# Patient Record
Sex: FEMALE
[De-identification: names, ages, dates, MRNs, and addresses within clinical notes are randomized per-mention and may not be internally consistent; named-entity substitution may affect disease eponyms.]

---

## 2017-08-04 ENCOUNTER — HOSPITAL ENCOUNTER (EMERGENCY)
Dept: HOSPITAL 31 - C.ER | Age: 57
LOS: 1 days | Discharge: HOME | End: 2017-08-05
Payer: MEDICAID

## 2017-08-04 VITALS — TEMPERATURE: 97.8 F

## 2017-08-04 VITALS — BODY MASS INDEX: 34 KG/M2

## 2017-08-04 DIAGNOSIS — R51: ICD-10-CM

## 2017-08-04 DIAGNOSIS — F41.9: Primary | ICD-10-CM

## 2017-08-04 DIAGNOSIS — G89.29: ICD-10-CM

## 2017-08-05 VITALS — RESPIRATION RATE: 18 BRPM | HEART RATE: 71 BPM | OXYGEN SATURATION: 98 %

## 2017-08-05 VITALS — DIASTOLIC BLOOD PRESSURE: 69 MMHG | SYSTOLIC BLOOD PRESSURE: 145 MMHG

## 2017-08-05 NOTE — C.PDOC
History Of Present Illness


54 year old female who presents to the ER with a complaint of neck pain and 

headache pain consistent with her fibromyalgia. Patient is requesting a refill 

of her valium tablets. [facility] reviewed, patient was found to have refills and 

many prescribers. 


Time Seen by Provider: 17 23:45


Chief Complaint (Nursing): Chest Pain


History Per: Patient


History/Exam Limitations: no limitations


Onset/Duration Of Symptoms: Days


Current Symptoms Are (Timing): Still Present


Associated Symptoms: denies: Nausea, Dyspnea, Diaphoresis


Modifying Factors: None


Exacerbating Factors: None


Alleviating Factors: None


Recent travel outside of the [place]: No





Past Medical History


Reviewed: Historical Data, Nursing Documentation, Vital Signs


Vital Signs: 


 Last Vital Signs











Temp  97.8 F   17 22:56


 


Pulse  71   17 00:00


 


Resp  18   17 00:00


 


BP  145/69   17 00:00


 


Pulse Ox  98   17 03:30














- Medical History


PMH: Arthritis, CAD, Depression, Diabetes, Fibromyalgia, HTN, 

Hypercholesterolemia, Migraine


Surgical History: Cholecystectomy, Endoscopy





- [facility] Procedures








APPLICATION OF SPLINT (01/22/15)








Family History: States: Unknown Family Hx





- Social History


Hx Tobacco Use: No


Hx Alcohol Use: No


Hx Substance Use: No





- Immunization History


Hx Tetanus Toxoid Vaccination: Yes


Hx Influenza Vaccination: Yes


Hx Pneumococcal Vaccination: Yes





Review Of Systems


Constitutional: Negative for: Fever, Chills


Gastrointestinal: Negative for: Nausea, Vomiting


Musculoskeletal: Positive for: Neck Pain


Neurological: Positive for: Headache





Physical Exam





- Physical Exam


Appears: Non-toxic, No Acute Distress, Other (Anxious)


Skin: Normal Color, Warm, Dry


Head: Atraumatic, Normacephalic


Oral Mucosa: Moist


Neck: Normal ROM, Paracervical Tenderness (Trapezious)


Chest: Symmetrical, No Tenderness


Cardiovascular: Rhythm Regular, No Murmur


Respiratory: Normal Breath Sounds, No Rales, No Rhonchi, No Wheezing


Gastrointestinal/Abdominal: Soft, No Tenderness


Neurological/Psych: Oriented x3, Normal Speech, Normal Cognition





ED Course And Treatment


ECG: Interpreted By Me, Viewed By Me


ECG Rhythm: Sinus Rhythm


ECG Interpretation: Normal


Rate From EC


O2 Sat by Pulse Oximetry: 98 (Room air)


Pulse Ox Interpretation: Normal


Progress Note: EKG ordered. Ultram and motrin administered.





Medical Decision Making


Medical Decision Making: 





anxiety/fibromyalgia 


normal neuro/cardio eval-nl ekg


? drug seeking- asking for 5 tabs of valium


[place] reviewed, may prescribers for valium x 5 tabs


Explained we cannot/will not refill chronic meds- verbalized understanding.





Disposition


Doctor Will See Patient In The: Office


Counseled Patient/Family Regarding: Studies Performed, Diagnosis





- Disposition


Referrals: 


Ginny Juárez MD [Medical Doctor] - 


Disposition: HOME/ ROUTINE


Disposition Time: 00:10


Condition: GOOD


Additional Instructions: 


no esta permitido rellenar ese medicamentos controllados en la jaron de 

emergencias


Sigue Ibuprofeno 600 mg o' tylenol 1000 mg cada 6 horas lauren necessario


Sigue con [facility].


Instructions:  General Headache (ED)


Forms:  [facility] (English)


Print Language: Palauan





- Clinical Impression


Clinical Impression: 


 Anxiety, Chronic headache disorder








- Scribe Statement


The provider has reviewed the documentation as recorded by the Scribrima Walters





All medical record entries made by the Scribe were at my direction and 

personally dictated by me. I have reviewed the chart and agree that the record 

accurately reflects my personal performance of the history, physical exam, 

medical decision making, and the department course for this patient. I have 

also personally directed, reviewed, and agree with the discharge instructions 

and disposition.

## 2017-08-07 NOTE — CARD
--------------- APPROVED REPORT --------------





EKG Measurement

Heart [id number]

[id number]

[id number]

[id number]

[id number]



<Conclusion>

Normal sinus rhythm

Cannot rule out Inferior infarct, age undetermined

Abnormal ECG

## 2017-11-02 ENCOUNTER — HOSPITAL ENCOUNTER (EMERGENCY)
Dept: HOSPITAL 31 - C.ER | Age: 57
Discharge: HOME | End: 2017-11-02
Payer: MEDICAID

## 2017-11-02 VITALS — DIASTOLIC BLOOD PRESSURE: 78 MMHG | HEART RATE: 78 BPM | RESPIRATION RATE: 20 BRPM | SYSTOLIC BLOOD PRESSURE: 132 MMHG

## 2017-11-02 VITALS — TEMPERATURE: 97.7 F | OXYGEN SATURATION: 97 %

## 2017-11-02 VITALS — BODY MASS INDEX: 34 KG/M2

## 2017-11-02 DIAGNOSIS — R51: ICD-10-CM

## 2017-11-02 DIAGNOSIS — E11.9: ICD-10-CM

## 2017-11-02 DIAGNOSIS — I10: ICD-10-CM

## 2017-11-02 DIAGNOSIS — E78.00: ICD-10-CM

## 2017-11-02 DIAGNOSIS — G89.29: Primary | ICD-10-CM

## 2017-11-02 DIAGNOSIS — M79.7: ICD-10-CM

## 2017-11-02 DIAGNOSIS — I25.10: ICD-10-CM

## 2017-11-02 NOTE — C.PDOC
History Of Present Illness


Patient is a 57 y/o female who presents to the ED c/o sharp, intermittent 

headache pains in the left head area 3-4 times per day and requests refills of 

narcotic pain relievers. Patient has a Hx of chronic HA; admits to seeing 

neurologist and has Fioricet at home. Patient reports to have been HA free for 

a few weeks. No other physical complaints at this time. 


chronic headaches, fibromyalgia, anxiety, ? drug seeking


[place]  reviewed- 10/17/17 Rx from another prescriber in .





reading, smiling, using telephone, NAD, certainly does not seem to be narcotic 

level pain 





Pt has neurologist and Fioricet to use @ home, follow-up pending. 


AGAIN explained unable to refill controlled substances for chronic issues (as 

in our prior visit 8/17)


Time Seen by Provider: 11/02/17 22:21


Chief Complaint (Nursing): Headache


History Per: Patient


History/Exam Limitations: no limitations


Onset/Duration Of Symptoms: Intermittent Episodes


Quality: Sharp


Recent travel outside of the [place]: No





Past Medical History


Reviewed: Historical Data, Nursing Documentation, Vital Signs


Vital Signs: 


 Last Vital Signs











Temp  97.7 F   11/02/17 21:34


 


Pulse  78   11/02/17 22:44


 


Resp  20   11/02/17 22:44


 


BP  132/78   11/02/17 22:44


 


Pulse Ox  97   11/02/17 22:44














- Medical History


PMH: Arthritis, CAD, Depression, Diabetes, Fibromyalgia, HTN, 

Hypercholesterolemia, Migraine


   Denies: HIV, Chronic Kidney Disease, Seizures, Sexually Transmitted Disease


Surgical History: Cholecystectomy, Endoscopy





- [facility] Procedures








APPLICATION OF SPLINT (01/22/15)








Family History: States: Unknown Family Hx





- Social History


Hx Tobacco Use: No


Hx Alcohol Use: No


Hx Substance Use: No





- Immunization History


Hx Tetanus Toxoid Vaccination: Yes


Hx Influenza Vaccination: Yes


Hx Pneumococcal Vaccination: Yes





Review Of Systems


Constitutional: Negative for: Fever, Chills


Gastrointestinal: Negative for: Nausea, Vomiting


Neurological: Positive for: Headache (left head area )





Physical Exam





- Physical Exam


Appears: Well, Non-toxic, No Acute Distress, Other (smiling)


Skin: Normal Color, Warm, Dry


Head: Atraumatic, Normacephalic


Eye(s): bilateral: Photophobia (negative)


Oral Mucosa: Moist


Chest: Symmetrical


Cardiovascular: Rhythm Regular, No Murmur


Respiratory: Normal Breath Sounds, No Rales, No Rhonchi, No Wheezing


Neurological/Psych: Oriented x3, Normal Speech, Normal Cognition, Other (no 

focal deficits)





ED Course And Treatment


O2 Sat by Pulse Oximetry: 97





Disposition


Doctor Will See Patient In The: Office


Counseled Patient/Family Regarding: Studies Performed, Diagnosis





- Disposition


Referrals: 


Ginny Juárez MD [Medical Doctor] - 


Disposition: HOME/ ROUTINE


Disposition Time: 22:35


Condition: GOOD


Additional Instructions: 


favor de seguir con [facility] para rellenos de ese medicamentos de 

sustancias controlladas.


Instructions:  Cluster Headache (ED)


Forms:  [facility] (English)


Print Language: English





- Clinical Impression


Clinical Impression: 


 Chronic headache disorder








- Scribe Statement


The provider has reviewed the documentation as recorded by the Scribe





Kamini Ingram





All medical record entries made by the Scribe were at my direction and 

personally dictated by me. I have reviewed the chart and agree that the record 

accurately reflects my personal performance of the history, physical exam, 

medical decision making, and the department course for this patient. I have 

also personally directed, reviewed, and agree with the discharge instructions 

and disposition.

## 2018-04-10 ENCOUNTER — HOSPITAL ENCOUNTER (EMERGENCY)
Dept: HOSPITAL 31 - C.ER | Age: 58
Discharge: HOME | End: 2018-04-10
Payer: MEDICAID

## 2018-04-10 VITALS
HEART RATE: 82 BPM | OXYGEN SATURATION: 98 % | TEMPERATURE: 97.3 F | RESPIRATION RATE: 22 BRPM | DIASTOLIC BLOOD PRESSURE: 82 MMHG | SYSTOLIC BLOOD PRESSURE: 131 MMHG

## 2018-04-10 VITALS — BODY MASS INDEX: 26.6 KG/M2
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
## 2018-04-10 DIAGNOSIS — S60.211A: Primary | ICD-10-CM

## 2018-04-10 PROCEDURE — 96372 THER/PROPH/DIAG INJ SC/IM: CPT

## 2018-04-10 PROCEDURE — 99284 EMERGENCY DEPT VISIT MOD MDM: CPT

## 2018-04-10 PROCEDURE — 73110 X-RAY EXAM OF WRIST: CPT

## 2018-04-10 NOTE — C.PDOC
History Of Present Illness


57 year old female presents to the emergency department complaining of right 

wrist pain, onset today. Patient states she was "playing with a door" and got 

her right wrist caught in the door frame. She is able to move all fingers. No 

changes in sensation. 





Time Seen by Provider: 04/10/18 00:42


Chief Complaint (Nursing): Upper Extremity Problem/Injury


History Per: Patient


History/Exam Limitations: no limitations


Onset/Duration Of Symptoms: Days (x1)


Current Symptoms Are (Timing): Still Present





Past Medical History


Reviewed: Historical Data, Nursing Documentation, Vital Signs


Vital Signs: 


 Last Vital Signs











Temp  97.3 F L  04/10/18 00:40


 


Pulse  82   04/10/18 00:40


 


Resp  22   04/10/18 00:40


 


BP  131/82   04/10/18 00:40


 


Pulse Ox  98   04/10/18 01:43














- Medical History


PMH: Arthritis, CAD, Depression, Diabetes, Fibromyalgia, HTN, 

Hypercholesterolemia, Migraine


   Denies: HIV, Chronic Kidney Disease, Seizures, Sexually Transmitted Disease


Surgical History: Cholecystectomy, Endoscopy





- [facility] Procedures








APPLICATION OF SPLINT (01/22/15)








Family History: States: Unknown Family Hx





- Social History


Hx Tobacco Use: No


Hx Alcohol Use: No


Hx Substance Use: No





- Immunization History


Hx Tetanus Toxoid Vaccination: Yes


Hx Influenza Vaccination: Yes


Hx Pneumococcal Vaccination: Yes





Review Of Systems


Musculoskeletal: Positive for: Hand Pain (right wrist)


Neurological: Negative for: Weakness, Numbness





Physical Exam





- Physical Exam


Appears: Non-toxic, No Acute Distress


Skin: Warm, Dry, No Rash


Head: Atraumatic, Normacephalic


Eye(s): bilateral: Normal Inspection


Extremity: Normal ROM, Tenderness (mild tenderness to right wrist), Capillary 

Refill (< 2 sec), No Deformity, Other (round ecchymotic lesion noted to right 

radial wrist, with abrasion to the ulnar wrist)


Pulses: Left Radial: Normal, Right Radial: Normal


Neurological/Psych: Oriented x3, Normal Speech, No Other (focal deficits)





ED Course And Treatment


O2 Sat by Pulse Oximetry: 98 (RA)


Pulse Ox Interpretation: Normal





- Other Rad


  ** X-Ray Right Wrist


X-Ray: Interpreted by Me, Viewed By Me


Interpretation: Negative fracture, negative dislocation





Medical Decision Making


Medical Decision Making: 


Impression: Right wrist injury


Plan:


* Toradol 30 mg IM


* X-Ray right wrist





Progress:


Xray negative for fracture.


On reevaluation patient reports improvement in pain. 


Informed patient of negative x-ray. Velcro volar splint applied to right wrist 

by RN. 


Patient counseled regarding diagnosis and advised to follow up with orthopedist 

for persistent symptoms. 





Disposition


Counseled Patient/Family Regarding: Diagnosis, Need For Followup, Rx Given





- Disposition


Referrals: 


Oli Martínez III, MD [Staff Provider] - 


Disposition: HOME/ ROUTINE


Disposition Time: 01:12


Condition: GOOD


Additional Instructions: 


Tu radiografa fue normal, sin fractura. Por favor aplique hielo en el raulito 15 

minutos laureen veces al da. Blue Motrin con Pepcid segn sea necesario para el 

dolor cada 6 horas, para no alterar el estmago. Steffen un seguimiento con 

ortopedia si el dolor persiste iam john semana.


Prescriptions: 


Famotidine [Pepcid] 20 mg PO DAILY #20 tab


Ibuprofen [Motrin] 1 tab PO TID PRN #30 tab


 PRN Reason: Pain


Instructions:  Contusion (DC)


Forms:  [employer] (Central African)


Print Language: Chinese





- POA


Present On Arrival: None





- Clinical Impression


Clinical Impression: 


 Wrist contusion








- PA / NP / Resident Statement


MD/DO has reviewed & agrees with the documentation as recorded.





- Scribe Statement


The provider has reviewed the documentation as recorded by the Scribe (Fanta Lopez)





All medical record entries made by the Scribe were at my direction and 

personally dictated by me. I have reviewed the chart and agree that the record 

accurately reflects my personal performance of the history, physical exam, 

medical decision making, and the department course for this patient. I have 

also personally directed, reviewed, and agree with the discharge instructions 

and disposition.

## 2018-05-05 ENCOUNTER — HOSPITAL ENCOUNTER (EMERGENCY)
Dept: HOSPITAL 31 - C.ER | Age: 58
Discharge: HOME | End: 2018-05-05
Payer: MEDICAID

## 2018-05-05 VITALS
RESPIRATION RATE: 20 BRPM | SYSTOLIC BLOOD PRESSURE: 131 MMHG | HEART RATE: 86 BPM | DIASTOLIC BLOOD PRESSURE: 84 MMHG | TEMPERATURE: 97.8 F | OXYGEN SATURATION: 98 %

## 2018-05-05 VITALS — BODY MASS INDEX: 26.6 KG/M2

## 2018-05-05 DIAGNOSIS — J30.2: ICD-10-CM

## 2018-05-05 DIAGNOSIS — H10.13: Primary | ICD-10-CM

## 2018-05-05 NOTE — C.PDOC
History Of Present Illness





56 yo female w/PMHx of NIDDM, chronic headache, seasonal allergy come in for 

evaluation of B/L eyes redness, itchiness, clear discharges gradually developed 

for past 2 days, (+) nasal congestion, clear rhinorrhea. Pt reports, use 

antihistamine eye drops, Claritin without improvement. Otherwise, pt denies 

high fever, chills, headache, vertigo, visual changes, focal deficits, bluury 

vision, FB sensation, pain on eye movement, throat tightness or swelling, 

dysphagia, dyspnea, SOB, wheezing, abd. pain, N/V, denies any other active 

complaints. Ambulate to Ed for evaluation, not in any apparent distress.


Time Seen by Provider: 05/05/18 22:10


Chief Complaint (Nursing): ENT Problem


History Per: Patient





Past Medical History


Reviewed: Historical Data, Nursing Documentation, Vital Signs


Vital Signs: 





 Last Vital Signs











Temp  97.8 F   05/05/18 22:05


 


Pulse  86   05/05/18 22:05


 


Resp  20   05/05/18 22:05


 


BP  131/84   05/05/18 22:05


 


Pulse Ox  98   05/05/18 22:05














- Medical History


PMH: Arthritis, CAD, Depression, Diabetes, Fibromyalgia, HTN, 

Hypercholesterolemia, Migraine


   Denies: HIV, Chronic Kidney Disease, Seizures, Sexually Transmitted Disease


Surgical History: Cholecystectomy, Endoscopy





- [facility] Procedures











APPLICATION OF SPLINT (01/22/15)








Family History: States: Unknown Family Hx





- Social History


Hx Tobacco Use: No


Hx Alcohol Use: No


Hx Substance Use: No





- Immunization History


Hx Tetanus Toxoid Vaccination: Yes


Hx Influenza Vaccination: Yes


Hx Pneumococcal Vaccination: Yes





Review Of Systems


Except As Marked, All Systems Reviewed And Found Negative.


Constitutional: Negative for: Fever, Chills


Eyes: Positive for: Conjunctivae Inflammation, Redness.  Negative for: Pain, 

Vision Change, Eyelid Inflammation


ENT: Positive for: Nose Congestion.  Negative for: Ear Discharge, Nose Discharge

, Throat Pain, Throat Swelling


Cardiovascular: Negative for: Chest Pain, Palpitations, Light Headedness


Respiratory: Negative for: Cough, Shortness of Breath, Wheezing


Gastrointestinal: Negative for: Nausea, Vomiting, Abdominal Pain


Musculoskeletal: Negative for: Neck Pain


Skin: Negative for: Rash


Neurological: Negative for: Weakness, Numbness, Altered Mental Status, Headache

, Dizziness





Physical Exam





- Physical Exam


Appears: Well, Non-toxic, No Acute Distress


Skin: Normal Color, Warm, Dry, No Rash


Head: Normacephalic


Eye(s): bilateral: PERRL, EOMI (no pain or limitation on extraocular movement B/

L), Other (scant periorbital edema and erythema. Mild conjunctival injection L>

R with clear discharges. No periorbital cellulitis.)


Ear(s): Bilateral: Normal


Nose: No Flaring, Discharge (B/L nasal congestion with scant clear rhinorrhea)


Oral Mucosa: Moist, No Drooling


Tongue: Normal Appearing, No Swelling


Lips: Normal Appearing, No Swelling


Throat: No Erythema, No Drooling, Other (Uvula midline, no edema)


Neck: Trachea Midline, Supple


Cardiovascular: Rhythm Regular, No Murmur, No JVD


Respiratory: No Decreased Breath Sounds, No Accessory Muscle Use, No Stridor, 

No Wheezing


Extremity: Normal ROM, No Deformity, No Swelling


Neurological/Psych: Oriented x3, Normal Speech





ED Course And Treatment


O2 Sat by Pulse Oximetry: 98


Pulse Ox Interpretation: Normal


Progress Note: On re-evaluation, pt is afebrile, hemodynamicaly stable.  Non-

toxic.  PulsEOx 98% RA.  ENT: no acute findings, uvula midline, no edema.  Eyes

: exam c/w scant periorbital edema and erythema with mild conjunctival injection

, clear discharge r/o allergic conjunctivitis. No pain or limitation on 

extraocular movement. No evidence of periorbital cellulitis.  neck: Supple, (-) 

meningeal sign.  Lungs: CTA B/L, BS equal B/L.  Neuorlogicaly intact.  Pt 

advised onc oruse of ds.  ref. to f/u with PMD, opht in 2-3 days for re-eval.  

return to Ed if any worsening ro new changes.





Disposition


Counseled Patient/Family Regarding: Diagnosis, Need For Followup, Rx Given





- Disposition


Referrals: 


Xochilt Marion MD [Medical Doctor] - 


Gideon Sams MD [Staff Provider] - 


Disposition: HOME/ ROUTINE


Disposition Time: 22:25


Condition: STABLE


Additional Instructions: 


Encourage fluids


Cold compresses to eyes area


Take medication as prescribed


Continue Claritin daily, Ketotifen eyes drops as need


Follow up with PMD, Ophthalmology in 2-3 days for re-evaluation.


Return to ED if any worsening or new changes.


Prescriptions: 


Prednisone [Deltasone] 40 mg PO DAILY #8 tablet


Instructions:  Seasonal Allergies in Adults


Print Language: Greek





- Clinical Impression


Clinical Impression: 


 Allergic conjunctivitis, Seasonal allergies

## 2018-06-08 ENCOUNTER — HOSPITAL ENCOUNTER (EMERGENCY)
Dept: HOSPITAL 31 - C.ER | Age: 58
LOS: 1 days | Discharge: HOME | End: 2018-06-09
Payer: MEDICAID

## 2018-06-08 VITALS — BODY MASS INDEX: 26.6 KG/M2

## 2018-06-08 VITALS
TEMPERATURE: 98.1 F | DIASTOLIC BLOOD PRESSURE: 80 MMHG | RESPIRATION RATE: 15 BRPM | HEART RATE: 80 BPM | SYSTOLIC BLOOD PRESSURE: 127 MMHG | OXYGEN SATURATION: 98 %

## 2018-06-08 DIAGNOSIS — E11.9: ICD-10-CM

## 2018-06-08 DIAGNOSIS — E78.00: ICD-10-CM

## 2018-06-08 DIAGNOSIS — M79.7: ICD-10-CM

## 2018-06-08 DIAGNOSIS — R25.2: Primary | ICD-10-CM

## 2018-06-08 DIAGNOSIS — I10: ICD-10-CM

## 2018-06-09 NOTE — C.PDOC
History Of Present Illness


58 y/o female, whose PMHx includes Fibromyalgia, presents to the ED for 

evaluation of right leg pain x 1 week. Patient describes symptoms are a burning 

sensation to her right posterior thigh which radiates down to her right calf. 

Patient has been applying a topical gel without relief. She denies rash, trauma

, extremity numbness/weakness, or recent travel. 


Time Seen by Provider: 06/08/18 23:23


Chief Complaint (Nursing): Lower Extremity Problem/Injury


History Per: Patient


History/Exam Limitations: no limitations


Onset/Duration Of Symptoms: Other (1 week)


Current Symptoms Are (Timing): Still Present


Recent travel outside of the [place]: No





Past Medical History


Reviewed: Historical Data, Nursing Documentation, Vital Signs


Vital Signs: 


 Last Vital Signs











Temp  98.1 F   06/08/18 23:01


 


Pulse  80   06/08/18 23:01


 


Resp  15   06/08/18 23:01


 


BP  127/80   06/08/18 23:01


 


Pulse Ox  98   06/09/18 06:31














- Medical History


PMH: Arthritis, CAD, Depression, Diabetes, Fibromyalgia, HTN, 

Hypercholesterolemia, Migraine


   Denies: HIV, Chronic Kidney Disease, Seizures, Sexually Transmitted Disease


Surgical History: Cholecystectomy, Endoscopy





- [facility] Procedures








APPLICATION OF SPLINT (01/22/15)








Family History: States: Unknown Family Hx





- Social History


Hx Tobacco Use: No


Hx Alcohol Use: No


Hx Substance Use: No





- Immunization History


Hx Tetanus Toxoid Vaccination: Yes


Hx Influenza Vaccination: Yes


Hx Pneumococcal Vaccination: Yes





Review Of Systems


Except As Marked, All Systems Reviewed And Found Negative.


Musculoskeletal: Positive for: Leg Pain (right)


Skin: Negative for: Rash


Neurological: Negative for: Weakness, Numbness





Physical Exam





- Physical Exam


Appears: Non-toxic, No Acute Distress, Other (mildly obese female )


Skin: Normal Color, Warm, Dry, No Rash


Head: Atraumatic, Normacephalic


Eye(s): bilateral: Normal Inspection, PERRL, EOMI


Oral Mucosa: Moist


Throat: No Erythema, No Exudate


Neck: Normal ROM, Supple


Chest: Symmetrical, No Deformity, No Tenderness


Cardiovascular: Rhythm Regular, No Friction Rub, No Murmur


Respiratory: Normal Breath Sounds, No Rales, No Rhonchi, No Wheezing


Gastrointestinal/Abdominal: Soft, No Tenderness


Back: Normal Inspection, No CVA Tenderness, No Vertebral Tenderness, No 

Paraspinal Tenderness


Extremity: Normal ROM, No Calf Tenderness, Capillary Refill (less than 2 

seconds ), No Deformity, No Swelling, Other ((+) tenderness to the posterior 

thigh)


Pulses: Left Dorsalis Pedis: Normal, Right Dorsalis Pedis: Normal


Neurological/Psych: Oriented x3, Normal Speech, Normal Cognition, Normal Motor, 

Normal Sensation


Gait: Steady





ED Course And Treatment


O2 Sat by Pulse Oximetry: 98 (on RA)


Pulse Ox Interpretation: Normal





Medical Decision Making


Medical Decision Making: 





Progress: 


Right Femur XR and Right TIb/Fib XR ordered. D-Dimer ordered. 


Ultram PO given. On re-exam, the patient reports improvement of symptoms. Lungs 

are CTA, heart is RRR, abdomen is soft, non-tender and tolerating PO well . 

ambulatory in the ED with steady gait with cane. Follow up with the medical 

doctor within 1-2 days. Return if worsened. 





Disposition





- Disposition


Referrals: 


[facility] at [facility] [Outside]


Disposition: HOME/ ROUTINE


Disposition Time: 00:39


Condition: FAIR


Additional Instructions: 


 Follow up with the medical doctor within 1-2 days without fail, return if 

worsened. 


Prescriptions: 


Acetaminophen [Tylenol] 325 mg PO Q6 PRN #30 tab


 PRN Reason: Pain, Mild (1-3)


traMADol [Ultram] 50 mg PO Q6 PRN #15 tab


 PRN Reason: Pain


Instructions:  Muscle Spasms (DC)


Forms:  [employer] (Portuguese)


Print Language: Kazakh





- Clinical Impression


Clinical Impression: 


 Muscle cramp








- PA / NP / Resident Statement


MD/DO has reviewed & agrees with the documentation as recorded.





- Scribe Statement


The provider has reviewed the documentation as recorded by the Scribe (Nena Calderon)








All medical record entries made by the Scribe were at my direction and 

personally dictated by me. I have reviewed the chart and agree that the record 

accurately reflects my personal performance of the history, physical exam, 

medical decision making, and the department course for this patient. I have 

also personally directed, reviewed, and agree with the discharge instructions 

and disposition.

## 2018-06-09 NOTE — RAD
PROCEDURE:  Radiographs of the right tibia and fibula. 



HISTORY:

pain to the shin, non-trauma



COMPARISON:

None available.



TECHNIQUE:

Frontal and lateral views obtained. 



FINDINGS:



BONES:

No fracture or destructive lesion.



JOINT SPACES:

Unremarkable.



OTHER FINDINGS:

Calcaneus spur is noted.



IMPRESSION:

No evidence of acute pathology or destructive bony lesion.

## 2018-06-09 NOTE — RAD
PROCEDURE:  Right Femur Radiographs.



HISTORY:

pain to the femur,



COMPARISON:

None.



TECHNIQUE:

AP and Lateral Radiographs of the right femur.



FINDINGS:



FEMUR:

Normal. No fracture. 



SOFT TISSUES:

Normal. 



OTHER FINDINGS:

None.



IMPRESSION:

No evidence of acute fracture or destructive bony lesion.

## 2018-07-29 ENCOUNTER — HOSPITAL ENCOUNTER (EMERGENCY)
Dept: HOSPITAL 31 - C.ER | Age: 58
Discharge: HOME | End: 2018-07-29
Payer: MEDICAID

## 2018-07-29 VITALS — BODY MASS INDEX: 26.6 KG/M2

## 2018-07-29 VITALS — RESPIRATION RATE: 20 BRPM

## 2018-07-29 VITALS
SYSTOLIC BLOOD PRESSURE: 116 MMHG | HEART RATE: 75 BPM | OXYGEN SATURATION: 97 % | TEMPERATURE: 98 F | DIASTOLIC BLOOD PRESSURE: 71 MMHG

## 2018-07-29 DIAGNOSIS — J02.9: Primary | ICD-10-CM

## 2018-07-29 DIAGNOSIS — L04.9: ICD-10-CM

## 2018-07-29 NOTE — C.PDOC
History Of Present Illness


56yo female, comes to ER with complaints of pain and swelling to the left side 

of her neck, worsened with swallowing. Otherwise, patient denies any associated 

fever, chills, cough, vomiting, headache or dizziness. She offers no additional 

medical complaints. 


Time Seen by Provider: 07/29/18 21:32


Chief Complaint (Nursing): ENT Problem


History Per: Patient


History/Exam Limitations: None


Onset/Duration Of Symptoms: Days


Current Symptoms Are (Timing): Still Present





Past Medical History


Reviewed: Historical Data, Nursing Documentation, Vital Signs


Vital Signs: 


 Last Vital Signs











Temp  98 F   07/29/18 21:22


 


Pulse  75   07/29/18 21:22


 


Resp  20   07/29/18 22:26


 


BP  116/71   07/29/18 21:22


 


Pulse Ox  97   07/30/18 01:31














- Medical History


PMH: Arthritis, CAD, Depression, Diabetes, Fibromyalgia, HTN, 

Hypercholesterolemia, Migraine


   Denies: HIV, Chronic Kidney Disease, Seizures, Sexually Transmitted Disease


Surgical History: Cholecystectomy, Endoscopy





- [facility] Procedures








APPLICATION OF SPLINT (01/22/15)








Family History: States: Unknown Family Hx





- Social History


Hx Tobacco Use: No


Hx Alcohol Use: No


Hx Substance Use: No





- Immunization History


Hx Tetanus Toxoid Vaccination: Yes


Hx Influenza Vaccination: Yes


Hx Pneumococcal Vaccination: Yes





Review Of Systems


Constitutional: Negative for: Fever, Chills


Respiratory: Negative for: Cough


Gastrointestinal: Negative for: Vomiting


Musculoskeletal: Positive for: Other (swelling to left side of neck)


Neurological: Negative for: Headache, Dizziness





Physical Exam





- Physical Exam


Appears: Non-toxic


Skin: Warm, Dry


Head: Normacephalic


Eye(s): bilateral: Normal Inspection


Ear(s): Bilateral: Normal


Nose: Normal


Oral Mucosa: Moist


Throat: Erythema, No Exudate, No Drooling, No Mass, Other (+ left tonsil 

swelling or erythema; + uvula midline)


Neck: Normal ROM, Supple


Lymphatic: Adenopathy (left submandibular lymphadenopathy with tenderness)


Chest: Symmetrical


Cardiovascular: Rhythm Regular


Respiratory: Normal Breath Sounds


Neurological/Psych: Oriented x3





ED Course And Treatment


O2 Sat by Pulse Oximetry: 97 (RA)


Pulse Ox Interpretation: Normal


Progress Note: Patient given Motrin and first dose of antibiotics in ER. 

Instructed to take medications as prescribed and to follow up with PMD in 2-3 

days.


Reassessment Condition: Improved





Disposition


Counseled Patient/Family Regarding: Diagnosis, Need For Followup, Rx Given





- Disposition


Referrals: 


[facility] [Outside]


PMD, Private [Other]


Disposition: HOME/ ROUTINE


Disposition Time: 22:16


Condition: STABLE


Additional Instructions: 


Gargle with warm salt water 





Take medications as directed





Follow up with PMD





Return to ER if worse 


Prescriptions: 


Ibuprofen [Motrin] 600 mg PO Q6H #20 tab


Penicillin VK [Penicillin VK Tab] 2 tab PO BID #28 tab


Instructions:  Sore Throat, Adult (DC)


Forms:  [facility] (English)


Print Language: Ukrainian





- Clinical Impression


Clinical Impression: 


 Pharyngitis, Lymphadenitis, acute








- PA / NP / Resident Statement


MD/DO has reviewed & agrees with the documentation as recorded.





- Scribe Statement


The provider has reviewed the documentation as recorded by the Ashok Boogie


Provider Attestation:


All medical record entries made by the Ashok were at my direction and 

personally dictated by me. I have reviewed the chart and agree that the record 

accurately reflects my personal performance of the history, physical exam, 

medical decision making, and the department course for this patient. I have 

also personally directed, reviewed, and agree with the discharge instructions 

and disposition.

## 2018-10-23 ENCOUNTER — HOSPITAL ENCOUNTER (OUTPATIENT)
Dept: HOSPITAL 31 - C.ER | Age: 58
Setting detail: OBSERVATION
LOS: 1 days | Discharge: HOME | End: 2018-10-24
Attending: INTERNAL MEDICINE | Admitting: INTERNAL MEDICINE
Payer: MEDICAID

## 2018-10-23 VITALS — BODY MASS INDEX: 26.6 KG/M2

## 2018-10-23 DIAGNOSIS — I25.10: ICD-10-CM

## 2018-10-23 DIAGNOSIS — I45.10: Primary | ICD-10-CM

## 2018-10-23 DIAGNOSIS — M79.7: ICD-10-CM

## 2018-10-23 DIAGNOSIS — E11.9: ICD-10-CM

## 2018-10-23 DIAGNOSIS — E78.5: ICD-10-CM

## 2018-10-23 DIAGNOSIS — I10: ICD-10-CM

## 2018-10-23 LAB
ALBUMIN SERPL-MCNC: 4.5 G/DL (ref 3.5–5)
ALBUMIN/GLOB SERPL: 1.2 {RATIO} (ref 1–2.1)
ALT SERPL-CCNC: 33 U/L (ref 9–52)
APTT BLD: 32 SECONDS (ref 21–34)
AST SERPL-CCNC: 49 U/L (ref 14–36)
BASOPHILS # BLD AUTO: 0 K/UL (ref 0–0.2)
BASOPHILS NFR BLD: 0.6 % (ref 0–2)
BUN SERPL-MCNC: 8 MG/DL (ref 7–17)
CALCIUM SERPL-MCNC: 9.2 MG/DL (ref 8.6–10.4)
CK MB SERPL-MCNC: < 0.22 NG/ML (ref 0–3.38)
EOSINOPHIL # BLD AUTO: 0 K/UL (ref 0–0.7)
EOSINOPHIL NFR BLD: 0.5 % (ref 0–4)
ERYTHROCYTE [DISTWIDTH] IN BLOOD BY AUTOMATED COUNT: 14.5 % (ref 11.5–14.5)
GFR NON-AFRICAN AMERICAN: > 60
HGB BLD-MCNC: 12.6 G/DL (ref 11–16)
INR PPP: 1.1
LYMPHOCYTES # BLD AUTO: 1.6 K/UL (ref 1–4.3)
LYMPHOCYTES NFR BLD AUTO: 24.1 % (ref 20–40)
MCH RBC QN AUTO: 26.7 PG (ref 27–31)
MCHC RBC AUTO-ENTMCNC: 32.9 G/DL (ref 33–37)
MCV RBC AUTO: 81.2 FL (ref 81–99)
MONOCYTES # BLD: 0.5 K/UL (ref 0–0.8)
MONOCYTES NFR BLD: 8.2 % (ref 0–10)
NEUTROPHILS # BLD: 4.3 K/UL (ref 1.8–7)
NEUTROPHILS NFR BLD AUTO: 66.6 % (ref 50–75)
NRBC BLD AUTO-RTO: 0.2 % (ref 0–2)
PLATELET # BLD: 226 K/UL (ref 130–400)
PMV BLD AUTO: 7.2 FL (ref 7.2–11.7)
PROTHROMBIN TIME: 12 SECONDS (ref 9.7–12.2)
RBC # BLD AUTO: 4.72 MIL/UL (ref 3.8–5.2)
WBC # BLD AUTO: 6.5 K/UL (ref 4.8–10.8)

## 2018-10-23 PROCEDURE — 71046 X-RAY EXAM CHEST 2 VIEWS: CPT

## 2018-10-23 PROCEDURE — 96372 THER/PROPH/DIAG INJ SC/IM: CPT

## 2018-10-23 PROCEDURE — 93005 ELECTROCARDIOGRAM TRACING: CPT

## 2018-10-23 PROCEDURE — 85610 PROTHROMBIN TIME: CPT

## 2018-10-23 PROCEDURE — 80053 COMPREHEN METABOLIC PANEL: CPT

## 2018-10-23 PROCEDURE — 93306 TTE W/DOPPLER COMPLETE: CPT

## 2018-10-23 PROCEDURE — 84484 ASSAY OF TROPONIN QUANT: CPT

## 2018-10-23 PROCEDURE — 87804 INFLUENZA ASSAY W/OPTIC: CPT

## 2018-10-23 PROCEDURE — 93970 EXTREMITY STUDY: CPT

## 2018-10-23 PROCEDURE — 71275 CT ANGIOGRAPHY CHEST: CPT

## 2018-10-23 PROCEDURE — 78582 LUNG VENTILAT&PERFUS IMAGING: CPT

## 2018-10-23 PROCEDURE — 82948 REAGENT STRIP/BLOOD GLUCOSE: CPT

## 2018-10-23 PROCEDURE — 82553 CREATINE MB FRACTION: CPT

## 2018-10-23 PROCEDURE — 85730 THROMBOPLASTIN TIME PARTIAL: CPT

## 2018-10-23 PROCEDURE — 82550 ASSAY OF CK (CPK): CPT

## 2018-10-23 PROCEDURE — 85025 COMPLETE CBC W/AUTO DIFF WBC: CPT

## 2018-10-23 NOTE — C.PDOC
History Of Present Illness


57 year old female with PMHx of HTN, DM and fibromyalgia presents to the ED c/o 

SOB associated with chest pain, generalized body aches and headache for the past

3 days. Patient reports she feels SOB when she talks. Patient recently got back 

from [place] on Sunday, patient states she as feeling SOB on the plane and the 

 gave her some oxygen. Patient denies fever, chills, nausea, 

vomit, dizziness, diarrhea, weakness, numbness.


Time Seen by Provider: 10/23/18 21:00


Chief Complaint (Nursing): Cough, Cold, Congestion


History Per: Patient


History/Exam Limitations: no limitations


Onset/Duration Of Symptoms: Days (3)


Location Of Pain: Diffuse Myalgias, Headache


Sick Contacts (Context): None


Ear Symptoms: Bilateral: None


Recent travel outside of the [place]: Yes (Back from [place] on Sunday)


Additional History Per: Patient





Past Medical History


Reviewed: Historical Data, Nursing Documentation, Vital Signs


Vital Signs: 





                                Last Vital Signs











Temp  98.8 F   10/23/18 20:30


 


Pulse  100 H  10/23/18 20:30


 


Resp  20   10/23/18 20:30


 


BP  151/83 H  10/23/18 20:30


 


Pulse Ox  99   10/23/18 20:30














- Medical History


PMH: Arthritis, CAD, Depression, Diabetes, Fibromyalgia, HTN, 

Hypercholesterolemia, Migraine


   Denies: HIV, Chronic Kidney Disease, Seizures, Sexually Transmitted Disease


Surgical History: Cholecystectomy, Endoscopy





- [facility] Procedures











APPLICATION OF SPLINT (01/22/15)








Family History: States: Unknown Family Hx





- Social History


Hx Tobacco Use: No


Hx Alcohol Use: No


Hx Substance Use: No





- Immunization History


Hx Tetanus Toxoid Vaccination: Yes


Hx Influenza Vaccination: Yes


Hx Pneumococcal Vaccination: Yes





Review Of Systems


Except As Marked, All Systems Reviewed And Found Negative.


Cardiovascular: Positive for: Chest Pain


Respiratory: Positive for: Shortness of Breath


Neurological: Positive for: Headache





Physical Exam





- Physical Exam


Additional Physical Exam Comments: 


Constitutional: Appears short of breath


Head: Normocephalic. Atraumatic.


Eyes: PERRL.


ENT: Moist mucous membranes.


Neck: Supple.


Cardiovascular: Regular rate. Radial pulse 2+ bilaterally.


Chest: No tenderness.


Respiratory: Clear to auscultation bilaterally.


GI: Soft. Nontender. Nondistended.


Back: No CVA tenderness.


Musculoskeletal: No tenderness or swelling of extremities.


Skin: No rash.


Neurologic: Alert, no focal deficit.





ED Course And Treatment





- Laboratory Results


Result Diagrams: 


                                 10/23/18 20:52





                                 10/23/18 20:52


O2 Sat by Pulse Oximetry: 99 (ON RA)


Pulse Ox Interpretation: Normal





Medical Decision Making


Medical Decision Making: 


Plan:


* EKG


* Labs


* CXR


* Influenza A B test





CT 





IMPRESSION: 





Due to suboptimal IV bolus,  while unlikely, PE is not excluded at segmental and

subsegmental branches 


No evidence of central pulmonary embolism.


 


Electronically signed on Oct 23, 2018 10:57:41 PM EDT by:


Valentino Rizvi M.D., KRISTINA Certified By [facility] & [facility]


Fellowship Trained MRI and CT Specialist





Dr. Fitzgerald accepts patient to medical service for V/Q tomorrow to further 

evaluate for PE.





Disposition





- Disposition


Disposition: HOSPITALIZED


Disposition Time: 22:58


Condition: FAIR


Forms:  [facility] (English)





- Clinical Impression


Clinical Impression: 


 Shortness of breath








- Scribe Statement


The provider has reviewed the documentation as recorded by the Scribe


Fer Lam





All medical record entries made by the Scribe were at my direction and 

personally dictated by me. I have reviewed the chart and agree that the record 

accurately reflects my personal performance of the history, physical exam, 

medical decision making, and the department course for this patient. I have also

personally directed, reviewed, and agree with the discharge instructions and 

disposition.

## 2018-10-24 VITALS
DIASTOLIC BLOOD PRESSURE: 66 MMHG | TEMPERATURE: 98.3 F | SYSTOLIC BLOOD PRESSURE: 146 MMHG | OXYGEN SATURATION: 98 % | HEART RATE: 75 BPM

## 2018-10-24 VITALS — RESPIRATION RATE: 18 BRPM

## 2018-10-24 NOTE — CP.PCM.PN
Subjective





- Date & Time of Evaluation


Date of Evaluation: 10/24/18


Time of Evaluation: 15:00





- Subjective


Subjective: 


 Ms. Christianson is a 57 year old female with a PMHx of HTN, DM II, Fibromyalgia, 

CAD, HLD, Migraines, and Depression who presented complaining of shortness of 

breath for 3 days when she came back to the [place] from her trip to [place].

Chest CT on admission did not show PE but was a suboptimal study. V/Q scan 

showed low probability for PE.  At this time patient reports major improvement 

of her shortness of breath.  She denies any fever, chills, chest pain, abdominal

pain, n/v, changes in bowel habits or diarrhea.





ROS: As stated aboe. 





PMHx: As stated above


Allergies: NKDA    








Objective





- Vital Signs/Intake and Output


Vital Signs (last 24 hours): 


                                        











Temp Pulse Resp BP Pulse Ox


 


 98.0 F   78   18   111/73   96 


 


 10/24/18 07:00  10/24/18 10:00  10/24/18 07:00  10/24/18 07:00  10/24/18 07:00











- Medications


Medications: 


                               Current Medications





Acetaminophen (Tylenol 325mg Tab)  325 mg PO Q6 PRN


   PRN Reason: Pain, Mild (1-3)


   Last Admin: 10/24/18 00:01 Dose:  325 mg


Albuterol/Ipratropium (Duoneb 3 Mg/0.5 Mg (3 Ml) Ud)  3 ml INH RQ4 PRN


   PRN Reason: Shortness of Breath


Aspirin (Aspirin Chewable)  81 mg PO DAILY [facility]


   Last Admin: 10/24/18 10:39 Dose:  81 mg


Enoxaparin Sodium (Lovenox)  70 mg SC Q12 FRANCISCO JAVIER


   Last Admin: 10/24/18 11:04 Dose:  70 mg


Glyburide (Micronase)  5 mg PO BRK [facility]


   Last Admin: 10/24/18 11:10 Dose:  5 mg


Hydrochlorothiazide (Hydrodiuril)  25 mg PO DAILY [facility]


   Last Admin: 10/24/18 11:09 Dose:  25 mg


Loratadine (Claritin)  10 mg PO DAILY [facility]


   Last Admin: 10/24/18 11:09 Dose:  10 mg


Losartan Potassium (Cozaar)  100 mg PO DAILY [facility]


   Last Admin: 10/24/18 11:10 Dose:  100 mg


Metformin HCl (Glucophage)  1,000 mg PO BIDCC [facility] hospital


   Last Admin: 10/24/18 11:03 Dose:  1,000 mg


Rosuvastatin Calcium (Crestor)  10 mg PO DAILY@2200 [facility] hospital











- Labs


Labs: 


                                        





                                 10/23/18 20:52 





                                 10/23/18 20:52 





                                        











PT  12.0 SECONDS (9.7-12.2)   10/23/18  20:52    


 


INR  1.1   10/23/18  20:52    


 


APTT  32 SECONDS (21-34)   10/23/18  20:52    














Assessment and Plan





- Assessment and Plan (Free Text)


Assessment: 


57 year old female with a PMHx of HTN, DM II, Fibromyalgia, CAD, HLD, Migraines,

and Depression admitted for evaluation and treatment of shortness of breath. 





Plan: 





Shortness of Breath.


EKG (Admission): NSR w/ Right bundle Branch Block


CTA negative for PE but suboptimal study


V/Q scan negative for PE. 


Lovenox 70mg SC Q12H 


DuoNebs Q4H PRN. 





HTN


ASA 81 Daily


HCTZ 25mg PO Daily


Losartan 100mg PO Daily





Diabetes


Metformin 1000 BID 


Glyburide 5mg PO





HLD


Rosuvastatin 10mg PO HS 





Proph


HHD


Lovenox





Dispo: Patient stable for discharge.  No new medications will be added.  Patient

and patient's daughter told to follow up with her primary medical physician 

within 7 days of discharge.

## 2018-10-24 NOTE — RAD
Date of service: 



10/23/2018



HISTORY:

 SOB, CHEST PAIN 



COMPARISON:

Chest radiograph 10/15/2015.



TECHNIQUE:

Chest PA and lateral



FINDINGS:



LUNGS:

No active pulmonary disease.



PLEURA:

No significant pleural effusion identified. No pneumothorax apparent.



CARDIOVASCULAR:

No aortic atherosclerotic calcification present







OSSEOUS STRUCTURES:

No significant abnormalities.



VISUALIZED UPPER ABDOMEN:

Normal.



OTHER FINDINGS:

None.



IMPRESSION:

No interval acute cardiopulmonary disease appreciated.

## 2018-10-24 NOTE — VASCLAB
Date of service: 



10/24/2018



PROCEDURE:  Lower Extremity Venous Duplex Exam.



HISTORY:

SHORTNESS OF BREATH 



PRIORS:

None. 



TECHNIQUE:

Bilateral common femoral, femoral, popliteal and posterior tibial, 

peroneal and great saphenous veins were evaluated. Flow was assessed 

with color Doppler, compressibility, assessment of phasic flow and 

augmentation response.



Report prepared by   MJ Zambrano



FINDINGS:



RIGHT:

1. Common Femoral Vein: 



1.1. Compressibility - Fully compressible: Thrombus -  None : Flow - 

Phasic: Augmentation -Normal: Reflux - None.



2. Femoral Vein:



2.1. Compressibility - Fully compressible: Thrombus -  None : Flow - 

Phasic: Augmentation -Normal: Reflux - None.



3. Popliteal Vein: 



3.1. Compressibility - Fully compressible: Thrombus - None :  Flow - 

Phasic: Augmentation -Normal: Reflux - None.



4. Posterior Tibial Vein: 



4.1. Compressibility - Fully compressible: Thrombus -  None: Flow - 

Phasic: Augmentation -Normal: Reflux - None.



5. Peroneal Vein:



5.1. Compressibility - Fully compressible: Thrombus -  None: Flow - 

Phasic: Augmentation -Normal: Reflux - None.



6. Great Saphenous Vein:

6.1. Compressibility - Fully compressible: Thrombus - None: Flow - 

Phasic: Augmentation - Normal: Reflux - None.





LEFT:

1. Common Femoral Vein:



1.1.  Compressibility - Fully compressible: Thrombus -  None: Flow - 

Phasic: Augmentation -Normal: Reflux - None.



2. Femoral Vein:



2.1.  Compressibility - Fully compressible: Thrombus -  None: Flow - 

Phasic: Augmentation -Normal: Reflux - None.



3. Popliteal Vein:



3.1.  Compressibility - Fully compressible: Thrombus -  None : Flow - 

Phasic: Augmentation -Normal: Reflux - None.



4. Posterior Tibial Vein:



4.1.  Compressibility - Fully compressible: Thrombus -  None: Flow - 

Phasic: Augmentation -Normal: Reflux - None.



5. Peroneal Vein:



5.1.  Compressibility - Fully compressible: Thrombus -  None: Flow - 

Phasic: Augmentation -Normal: Reflux - None.



6. Great Saphenous Vein:

6.1.  Compressibility - Fully compressible: Thrombus -  None: Flow - 

Phasic: Augmentation - Normal: Reflux - None.





OTHER FINDINGS:  Right: None significant.



Left: None significant.



IMPRESSION:

Right: 



No evidence of deep or superficial vein thrombosis of the right lower 

extremity. Normal valve function noted of the right side.     



Left: 



No evidence of deep or superficial vein thrombosis of the left lower 

extremity. Normal valve function noted of the left side.

## 2018-10-24 NOTE — NM
Date of service: 



10/24/2018



COMPARISON:

Portable chest 10/23/2018.  CT angiogram chest 10/23/2018 as well



TECHNIQUE:

10.6 mCi   [id number] Gas was utilized for inhalation agent. 



3.6 mCI technetium 99-m MAA administered intravenously.



FINDINGS:



VENTILATION COMPONENT:

Normal.



PERFUSION COMPONENT:

No definite perfusion defect appreciated bilaterally.



IMPRESSION:

Lowprobability ventilation perfusion scan for pulmonary embolism.

## 2018-10-24 NOTE — CARD
--------------- APPROVED REPORT --------------





Date of service: 10/24/2018



EXAM: Two-dimensional and M-mode echocardiogram with Doppler and 

color Doppler.



Other Information 

Quality : GoodRhythm : 



INDICATION

Dyspnea Cardiac Disease: CAD Chest Pain RBBB



RISK FACTORS

Hypertension 

Hyperlipidemia

Diabetes



2D DIMENSIONS 

IVSd1.3   (0.7-1.1cm)LVDd3.6   (3.9-5.9cm)

PWd1.2   (0.7-1.1cm)LA [id number]   (18-58mL)

LVDs2.8   (2.5-4.0cm)FS (%) 21.8   %

LVEF (%)70.0   (>50%)LVEF (Lewis's)69.06 %



M-Mode DIMENSIONS 

Left Atrium (MM)3.00   (2.5-4.0cm)IVSd1.06   (0.7-1.1cm)

Aortic Root3.73   (2.2-3.7cm)LVDd5.03   (4.0-5.6cm)

Aortic Cusp Exc.1.87   (1.5-2.0cm)PWd1.15   (0.7-1.1cm)

FS (%) 42   %LVDs2.92   (2.0-3.8cm)

LVEF (%)73   (>50%)



Mitral Valve

MV E Tnfbryze51.1cm/sMV A Zdscilrt77.0cm/sE/A ratio1.3



TDI

Lateral E' Peak V10.09cm/sMedial E' Peak V6.93cm/sE/Lateral E'6.8

E/Medial E'10.0



Tricuspid Valve

TR Peak [id number]/sTR Peak Gr.24okSrQBNB51ymZu



 LEFT VENTRICLE 

The left ventricle is normal size.

There is normal left ventricular wall thickness.

The left ventricular function is normal.

The left ventricular ejection fraction is within the normal range.

There is normal LV segmental wall motion.

The left ventricular diastolic function is normal.



 RIGHT VENTRICLE 

The right ventricle is normal size.

There is normal right ventricular wall thickness.

The right ventricular systolic function is normal.



 ATRIA 

The left atrium size is normal.

The right atrium size is normal.



 AORTIC VALVE 

The aortic valve is mildly thickened.

No aortic regurgitation is present.

There is no aortic valvular stenosis. 



 MITRAL VALVE 

The mitral valve is mildly thickened.

There is no mitral valve stenosis.

There is no mitral valve regurgitation noted.



 TRICUSPID VALVE 

The tricuspid valve is normal in structure.

There is trace tricuspid regurgitation.



 PULMONIC VALVE 

The pulmonary valve is normal in structure.

There is trace pulmonic valvular regurgitation. 



 GREAT VESSELS 

The aortic root is normal in size.

The IVC is normal in size and collapses >50% with inspiration.



 PERICARDIAL EFFUSION 

There is no pericardial effusion.



<Conclusion>

The left ventricle is normal size.

There is normal left ventricular wall thickness.

The left ventricular function is normal.

The left ventricular ejection fraction is within the normal range.

There is normal LV segmental wall motion.

The left ventricular diastolic function is normal.

## 2018-10-24 NOTE — CARD
--------------- APPROVED REPORT --------------





Date of service: 10/23/2018



EKG Measurement

Heart [id number]

CT [id number]

[id number]

[id number]

[id number]



<Conclusion>

Normal sinus rhythm

Right bundle branch block

Abnormal ECG

## 2018-10-24 NOTE — CT
Date of service: 10/23/2018



CTA chest PE protocol 



Indication: dyspnea, r/o PE



Technique: 



Contiguous axial images were obtained through the chest with 

intravenous contrast enhancement. Sagittal and coronal 

reconstructions were generated and reviewed. 



This CT exam was performed using 1 or more of the following dose 

reduction techniques: Automated exposure control, adjustment of the 

MAA and/or kV according to patient size, and/or use of iterative 

reconstruction technique. 



IV contrast: 100 cc visi opaque 320 IV







Radiation dose (DLP): 513.52 MGy-cm. 



Comparison: Chest x-ray performed 10/23/18



Findings: 



Visualized portions of the inferior thyroid gland appear 

unremarkable. The mediastinal and hilar vascular structures appear 

within normal limits.  The heart appears within normal limits of 

size. Mild atherosclerotic calcification of the aorta present. 



There is suboptimal opacification of the pulmonary arteries due to 

missed bolus of the intravenous contrast limiting evaluation for 

pulmonary embolus. Pulmonary embolus cannot be excluded on the basis 

of this study. 



No focal consolidation. No pleural effusion. No pneumothorax. No 

suspicious pulmonary nodules measuring greater than 5 mm. 



Limited visualized portions of the upper abdomen: Partially imaged 

hepatomegaly. Subtle nodular hepatic contour. Evidence of 

cholecystectomy. 



Degenerative changes of the spine. Osseous demineralization. 



Impression: 



There is suboptimal opacification of the pulmonary arteries due to 

missed bolus of the intravenous contrast limiting evaluation for 

pulmonary embolus. Pulmonary embolus cannot be excluded on the basis 

of this study. 



Limited visualized portions of the upper abdomen: Partially imaged 

hepatomegaly. Subtle nodular hepatic contour. Evidence of 

cholecystectomy. 



Preliminary impression was provided by [facility].

## 2021-11-02 NOTE — RAD
PROCEDURE:  Right Wrist Radiographs.







HISTORY:

pain s.p injury, door closed



COMPARISON:

None.



FINDINGS:



BONES:

Normal. No fracture.



JOINTS:

Minimal arthropathic changes: Radiocarpal and  metacarpal joint. No 

dislocation. 



SOFT TISSUES:

Normal. 



OTHER FINDINGS:

None.



IMPRESSION:

No fracture or dislocation. .  Mild arthropathic changes Negative